# Patient Record
(demographics unavailable — no encounter records)

---

## 2025-04-23 NOTE — ADDENDUM
[FreeTextEntry1] :  I, Radha Lincoln, acted solely as a scribe for Dr. Andrés Bryan MD on this date 04/23/2025    All medical record entries made by the Scribe were at my, Dr. Andrés Bryan MD., direction and personally dictated by me on 04/23/2025 . I have reviewed the chart and agree that the record accurately reflects my personal performance of the history, physical exam, assessment and plan. I have also personally directed, reviewed, and agreed with the chart.

## 2025-04-23 NOTE — HISTORY OF PRESENT ILLNESS
[de-identified] : Patient is a 53 year old female who presents today for an initial evaluation of low back pain. She reports that she has difficulty working. She states that she is not able to sit for extended periods of time. Denies any trauma or inciting incident. She reported to the ED for low back pain and was prescribed Naproxen and Tylenol for pain relief. She reports that her pain has decreased. She states that she would be able to walk five blocks. Patient denies bowel/bladder incontinence. Denies saddle anesthesia.

## 2025-04-23 NOTE — ASSESSMENT
[FreeTextEntry1] : I had a lengthy discussion with the patient in regards to their treatment plan and diagnosis.  They do have objective weakness findings on my exam.  Their symptoms have persisted despite the conservative management they have attempted thus far.  As a result I would like to proceed with a lumbar MRI.  In tandem with this they should begin a physical therapy/home therapy program.  The patient can take Diclofenac, Tizanidine, and Tylenol as needed for pain control if medically able to.  I will have the patient follow-up in 3 to 4 weeks for repeat clinical evaluation.  I encouraged them to follow-up sooner if their symptoms worsen or change in any way.

## 2025-04-23 NOTE — PHYSICAL EXAM
[de-identified] :  Lumbar Physical Exam   Gait - Normal   Station - Normal   Sagittal balance - Normal   Compensatory mechanism? - None   Heel walk - Normal   Toe walk - Normal   Reflexes Patellar - normal Gastroc - normal Clonus - No   Hip Exam - Normal   Straight leg raise - none   Pulses - 2+ dp/pt   Range of motion - normal   Sensation Sensation intact to light touch in L1, L2, L3, L4, L5 and S1 dermatomes bilaterally   Motor IP Quad HS TA Gastroc EHL Right 3+/5 5/5 5/5 5/5 5/5 5/5 Left 3+/5 5/5 5/5 5/5 5/5 5/5 [de-identified] : lumbar rads  mild degenerative scoliosis facet arthropathy disc degeneration